# Patient Record
Sex: FEMALE | Race: WHITE | NOT HISPANIC OR LATINO
[De-identification: names, ages, dates, MRNs, and addresses within clinical notes are randomized per-mention and may not be internally consistent; named-entity substitution may affect disease eponyms.]

---

## 2021-06-10 ENCOUNTER — APPOINTMENT (OUTPATIENT)
Dept: POPULATION HEALTH | Facility: CLINIC | Age: 41
End: 2021-06-10

## 2021-08-10 PROBLEM — Z00.00 ENCOUNTER FOR PREVENTIVE HEALTH EXAMINATION: Status: ACTIVE | Noted: 2021-08-10

## 2022-01-11 ENCOUNTER — TRANSCRIPTION ENCOUNTER (OUTPATIENT)
Age: 42
End: 2022-01-11

## 2022-01-19 ENCOUNTER — RESULT REVIEW (OUTPATIENT)
Age: 42
End: 2022-01-19

## 2022-04-21 ENCOUNTER — APPOINTMENT (OUTPATIENT)
Dept: ORTHOPEDIC SURGERY | Facility: CLINIC | Age: 42
End: 2022-04-21
Payer: COMMERCIAL

## 2022-04-21 PROCEDURE — 99213 OFFICE O/P EST LOW 20 MIN: CPT

## 2022-04-21 NOTE — PHYSICAL EXAM
[Normal Coordination] : normal coordination [Normal Sensation] : normal sensation [Normal Mood and Affect] : normal mood and affect [Orientated] : orientated [Able to Communicate] : able to communicate [Normal Skin] : normal skin [No obvious lymphadenopathy in areas examined] : no obvious lymphadenopathy in areas examined [Well Developed] : well developed [Well Nourished] : well nourished [Peripheral vascular exam is grossly normal] : peripheral vascular exam is grossly normal [No Respiratory Distress] : no respiratory distress [Right] : right shoulder [Standing] : standing [5___] : abduction 5[unfilled]/5 [4___] : internal rotation 4[unfilled]/5 [] : motor and sensory intact distally [FreeTextEntry3] : ecchymosis noted posterior spine from cupping [FreeTextEntry8] : palpation of the right shoulder as follows: no AC joint tenderness, no proximal biceps tenderness, no supraspinatus tenderness. [de-identified] : 4 out of 5 supraspinatus [TWNoteComboBox7] : active forward flexion 140 degrees [de-identified] : active abduction 80 degrees [TWNoteComboBox6] : internal rotation L1 [de-identified] : external rotation 60 degrees

## 2022-04-21 NOTE — REASON FOR VISIT
[FreeTextEntry2] : The patient is s/p right shoulder arthroscopic posterior labral repair utilizing 2 anchors with posterior SLAP repair utilizing an additional 1 anchor performed on 01/19/2022.

## 2022-04-21 NOTE — DISCUSSION/SUMMARY
[de-identified] : The patient is s/p right shoulder arthroscopic posterior labral repair utilizing 2 anchors with posterior SLAP repair utilizing an additional 1 anchor performed on 01/19/2022.\par \par The patient is doing well postop. \par The patient will continue supervised PT for the right shoulder and then transition to HEP and all activity as tolerated \par The patient will follow up in 3 months \par She would be cleared to proceed with rock climbing and other high level activity 6 months from the date of her surgery \par \par \par I, Dr. Clark Beltre, attest that this documentation was recorded by the scribe, in my presence, and that it accurately reflects the service I personally performed, and the decisions made by me with my edits as appropriate.\par \par I, Nithin Ramirez, acting as scribe, attest that this documentation has been prepared under the direction and in the presence of Provider Clark Beltre MD.\par

## 2022-04-21 NOTE — HISTORY OF PRESENT ILLNESS
[de-identified] : The patient is a 41 year old female her for her 3 month follow up on her right shoulder. The patient has been progressing in physical therapy. She has some mild complaints of soreness and tightness when doing activities. She periodically takes Advil if needed. No complaints of numbness or paraesthesia in the upper extremity. Denies any fevers, chills, sweats.

## 2022-07-21 ENCOUNTER — APPOINTMENT (OUTPATIENT)
Dept: ORTHOPEDIC SURGERY | Facility: CLINIC | Age: 42
End: 2022-07-21

## 2022-07-21 DIAGNOSIS — M25.511 PAIN IN RIGHT SHOULDER: ICD-10-CM

## 2022-07-21 DIAGNOSIS — Z98.890 OTHER SPECIFIED POSTPROCEDURAL STATES: ICD-10-CM

## 2022-07-21 PROCEDURE — 99213 OFFICE O/P EST LOW 20 MIN: CPT

## 2022-07-21 NOTE — PHYSICAL EXAM
[Right] : right shoulder [4___] : abduction 4[unfilled]/5 [5___] : internal rotation 5[unfilled]/5 [de-identified] : Constitutional: The general appearance of the patient is well developed, well nourished, no deformities and well groomed.Normal\par  \par Gait: Gait and function is as follows:normal gait. \par  \par Skin: Head and neck visualized skin is normal.Left upper extremity visualized skin is normal.Right upper extremity visualized skin is normal.\par  \par Cardiovascular: palpable radial pulse bilaterally. \par  \par Neurologic: The patient is oriented to time, place and person.Sensation to light touch intact in the bilateral upper extremities.Mood and Affect is normal. \par \par  [] : no tenderness at bicipital groove [FreeTextEntry8] : no supra TTP. [FreeTextEntry9] : Internal rotation T10. [de-identified] : Supra 4/5. [TWNoteComboBox7] : active forward flexion 160 degrees [de-identified] : active abduction 90 degrees [de-identified] : external rotation 60 degrees

## 2022-07-21 NOTE — DISCUSSION/SUMMARY
[de-identified] : The patient is s/p right shoulder arthroscopic posterior labral repair utilizing 2 anchors with posterior SLAP repair utilizing an additional 1 anchor performed on 01/19/2022.\par \par \par The patient is doing well overall.\par The patient will transition to HEP.\par The patient may proceed with all activities as tolerated.\par The patient will follow up as needed.\par \par \par \par I, Dr. Clark Beltre, attest that this documentation was recorded by the scribe, in my presence, and that it accurately reflects the service I personally performed, and the decisions made by me with my edits as appropriate.  \par \par I, Lynn Baird, acting as scribe, attest that this documentation has been prepared under the direction and in the presence of Provider Clark Beltre MD.\par

## 2022-07-21 NOTE — HISTORY OF PRESENT ILLNESS
[de-identified] : The patient is doing well. The patient has completed supervised PT. The patient reports the right shoulder feels stable. She is using ice periodically. The patient denies fevers, chills, or drainage. The patient denies new injuries or trauma.

## 2022-10-31 ENCOUNTER — NON-APPOINTMENT (OUTPATIENT)
Age: 42
End: 2022-10-31

## 2022-11-03 ENCOUNTER — NON-APPOINTMENT (OUTPATIENT)
Age: 42
End: 2022-11-03

## 2022-11-03 ENCOUNTER — APPOINTMENT (OUTPATIENT)
Dept: OPHTHALMOLOGY | Facility: CLINIC | Age: 42
End: 2022-11-03

## 2022-11-03 PROCEDURE — 92004 COMPRE OPH EXAM NEW PT 1/>: CPT
